# Patient Record
Sex: FEMALE | URBAN - METROPOLITAN AREA
[De-identification: names, ages, dates, MRNs, and addresses within clinical notes are randomized per-mention and may not be internally consistent; named-entity substitution may affect disease eponyms.]

---

## 2017-01-10 ENCOUNTER — IMPORTED ENCOUNTER (OUTPATIENT)
Dept: URBAN - METROPOLITAN AREA CLINIC 59 | Facility: CLINIC | Age: 47
End: 2017-01-10

## 2017-01-10 PROBLEM — H20.011 PRIMARY IRIDOCYCLITIS, RIGHT EYE: Noted: 2017-01-10

## 2017-01-10 PROCEDURE — 99213 OFFICE O/P EST LOW 20 MIN: CPT

## 2017-01-24 ENCOUNTER — IMPORTED ENCOUNTER (OUTPATIENT)
Dept: URBAN - METROPOLITAN AREA CLINIC 59 | Facility: CLINIC | Age: 47
End: 2017-01-24

## 2017-01-24 PROBLEM — H20.011 PRIMARY IRIDOCYCLITIS, RIGHT EYE: Noted: 2017-01-24

## 2017-01-24 PROCEDURE — 92012 INTRM OPH EXAM EST PATIENT: CPT

## 2017-02-20 ENCOUNTER — IMPORTED ENCOUNTER (OUTPATIENT)
Dept: URBAN - METROPOLITAN AREA CLINIC 59 | Facility: CLINIC | Age: 47
End: 2017-02-20

## 2017-02-20 PROBLEM — H20.011 PRIMARY IRIDOCYCLITIS, RIGHT EYE: Noted: 2017-02-20

## 2017-02-20 PROBLEM — H57.02 ANISOCORIA: Noted: 2017-02-20

## 2017-02-20 PROCEDURE — 99213 OFFICE O/P EST LOW 20 MIN: CPT

## 2017-03-15 ENCOUNTER — IMPORTED ENCOUNTER (OUTPATIENT)
Dept: URBAN - METROPOLITAN AREA CLINIC 59 | Facility: CLINIC | Age: 47
End: 2017-03-15

## 2017-03-15 PROBLEM — H57.02 ANISOCORIA: Noted: 2017-03-15

## 2017-03-15 PROBLEM — H40.051 OCULAR HYPERTENSION, RIGHT EYE: Noted: 2017-03-15

## 2017-03-15 PROBLEM — H20.011 PRIMARY IRIDOCYCLITIS, RIGHT EYE: Noted: 2017-03-15

## 2017-03-15 PROCEDURE — 99213 OFFICE O/P EST LOW 20 MIN: CPT

## 2017-03-29 ENCOUNTER — IMPORTED ENCOUNTER (OUTPATIENT)
Dept: URBAN - METROPOLITAN AREA CLINIC 59 | Facility: CLINIC | Age: 47
End: 2017-03-29

## 2017-03-29 PROBLEM — H40.051 OCULAR HYPERTENSION, RIGHT EYE: Noted: 2017-03-29

## 2017-03-29 PROBLEM — H57.02 ANISOCORIA: Noted: 2017-03-29

## 2017-03-29 PROBLEM — H20.011 PRIMARY IRIDOCYCLITIS, RIGHT EYE: Noted: 2017-03-29

## 2017-03-29 PROCEDURE — 99213 OFFICE O/P EST LOW 20 MIN: CPT

## 2017-04-18 ENCOUNTER — IMPORTED ENCOUNTER (OUTPATIENT)
Dept: URBAN - METROPOLITAN AREA CLINIC 59 | Facility: CLINIC | Age: 47
End: 2017-04-18

## 2017-04-18 PROBLEM — H40.051 OCULAR HYPERTENSION, RIGHT EYE: Noted: 2017-04-18

## 2017-04-18 PROBLEM — H20.011 PRIMARY IRIDOCYCLITIS, RIGHT EYE: Noted: 2017-04-18

## 2017-04-18 PROBLEM — H57.02 ANISOCORIA: Noted: 2017-04-18

## 2017-04-18 PROCEDURE — 99214 OFFICE O/P EST MOD 30 MIN: CPT

## 2017-05-02 ENCOUNTER — IMPORTED ENCOUNTER (OUTPATIENT)
Dept: URBAN - METROPOLITAN AREA CLINIC 59 | Facility: CLINIC | Age: 47
End: 2017-05-02

## 2017-05-02 PROBLEM — H57.02 ANISOCORIA: Noted: 2017-05-02

## 2017-05-02 PROBLEM — H20.011 PRIMARY IRIDOCYCLITIS, RIGHT EYE: Noted: 2017-05-02

## 2017-05-02 PROBLEM — H40.051 OCULAR HYPERTENSION, RIGHT EYE: Noted: 2017-05-02

## 2017-05-02 PROCEDURE — 99213 OFFICE O/P EST LOW 20 MIN: CPT

## 2017-05-02 PROCEDURE — 92134 CPTRZ OPH DX IMG PST SGM RTA: CPT

## 2017-05-22 ENCOUNTER — IMPORTED ENCOUNTER (OUTPATIENT)
Dept: URBAN - METROPOLITAN AREA CLINIC 59 | Facility: CLINIC | Age: 47
End: 2017-05-22

## 2017-05-22 PROBLEM — H20.011 PRIMARY IRIDOCYCLITIS, RIGHT EYE: Noted: 2017-05-22

## 2017-05-22 PROBLEM — H57.02 ANISOCORIA: Noted: 2017-05-22

## 2017-05-22 PROBLEM — H40.051 OCULAR HYPERTENSION, RIGHT EYE: Noted: 2017-05-22

## 2017-05-22 PROCEDURE — 99213 OFFICE O/P EST LOW 20 MIN: CPT

## 2017-06-14 ENCOUNTER — IMPORTED ENCOUNTER (OUTPATIENT)
Dept: URBAN - METROPOLITAN AREA CLINIC 59 | Facility: CLINIC | Age: 47
End: 2017-06-14

## 2017-06-14 PROBLEM — H20.011 PRIMARY IRIDOCYCLITIS, RIGHT EYE: Noted: 2017-06-14

## 2017-06-14 PROBLEM — H20.811 FUCHS' HETEROCHROMIC CYCLITIS, RIGHT EYE: Noted: 2017-06-14

## 2017-06-14 PROCEDURE — 99213 OFFICE O/P EST LOW 20 MIN: CPT

## 2017-06-19 ENCOUNTER — IMPORTED ENCOUNTER (OUTPATIENT)
Dept: URBAN - METROPOLITAN AREA CLINIC 59 | Facility: CLINIC | Age: 47
End: 2017-06-19

## 2017-06-19 PROBLEM — H57.02 ANISOCORIA: Noted: 2017-06-19

## 2017-06-19 PROBLEM — H40.051 OCULAR HYPERTENSION, RIGHT EYE: Noted: 2017-06-19

## 2017-06-19 PROBLEM — H20.011 PRIMARY IRIDOCYCLITIS, RIGHT EYE: Noted: 2017-06-19

## 2017-06-19 PROCEDURE — 99213 OFFICE O/P EST LOW 20 MIN: CPT

## 2017-07-24 ENCOUNTER — IMPORTED ENCOUNTER (OUTPATIENT)
Dept: URBAN - METROPOLITAN AREA CLINIC 59 | Facility: CLINIC | Age: 47
End: 2017-07-24

## 2017-07-24 PROBLEM — H57.02 ANISOCORIA: Noted: 2017-07-24

## 2017-07-24 PROBLEM — H20.011 PRIMARY IRIDOCYCLITIS, RIGHT EYE: Noted: 2017-07-24

## 2017-07-24 PROBLEM — H40.051 OCULAR HYPERTENSION, RIGHT EYE: Noted: 2017-07-24

## 2017-07-24 PROCEDURE — 92134 CPTRZ OPH DX IMG PST SGM RTA: CPT

## 2017-07-24 PROCEDURE — 99204 OFFICE O/P NEW MOD 45 MIN: CPT

## 2017-08-08 ENCOUNTER — IMPORTED ENCOUNTER (OUTPATIENT)
Dept: URBAN - METROPOLITAN AREA CLINIC 59 | Facility: CLINIC | Age: 47
End: 2017-08-08

## 2017-08-08 PROBLEM — H20.011 PRIMARY IRIDOCYCLITIS, RIGHT EYE: Noted: 2017-08-08

## 2017-08-08 PROBLEM — Z01.01 ENCOUNTER FOR EXAMINATION OF VISION WITH ABNORMAL FINDINGS: Noted: 2017-08-08

## 2017-08-23 ENCOUNTER — IMPORTED ENCOUNTER (OUTPATIENT)
Dept: URBAN - METROPOLITAN AREA CLINIC 59 | Facility: CLINIC | Age: 47
End: 2017-08-23

## 2017-08-23 PROBLEM — H40.051 OCULAR HYPERTENSION, RIGHT EYE: Noted: 2017-08-23

## 2017-08-23 PROBLEM — H20.011 PRIMARY IRIDOCYCLITIS, RIGHT EYE: Noted: 2017-08-23

## 2017-08-23 PROCEDURE — 92134 CPTRZ OPH DX IMG PST SGM RTA: CPT

## 2017-08-23 PROCEDURE — 99214 OFFICE O/P EST MOD 30 MIN: CPT

## 2017-08-28 ENCOUNTER — IMPORTED ENCOUNTER (OUTPATIENT)
Dept: URBAN - METROPOLITAN AREA CLINIC 59 | Facility: CLINIC | Age: 47
End: 2017-08-28

## 2017-08-28 PROBLEM — H20.011 PRIMARY IRIDOCYCLITIS, RIGHT EYE: Noted: 2017-08-28

## 2017-08-28 PROCEDURE — 92015 DETERMINE REFRACTIVE STATE: CPT

## 2017-09-06 ENCOUNTER — IMPORTED ENCOUNTER (OUTPATIENT)
Dept: URBAN - METROPOLITAN AREA CLINIC 59 | Facility: CLINIC | Age: 47
End: 2017-09-06

## 2017-09-06 PROBLEM — H20.011 PRIMARY IRIDOCYCLITIS, RIGHT EYE: Noted: 2017-09-06

## 2017-09-06 PROBLEM — H25.11 NUCLEAR SCLEROSIS CATARACT OF RT EYE: Noted: 2017-09-06

## 2017-09-06 PROBLEM — H40.051 OCULAR HYPERTENSION, RIGHT EYE: Noted: 2017-09-06

## 2017-09-06 PROCEDURE — 92014 COMPRE OPH EXAM EST PT 1/>: CPT

## 2017-09-06 PROCEDURE — 92134 CPTRZ OPH DX IMG PST SGM RTA: CPT

## 2017-11-06 ENCOUNTER — IMPORTED ENCOUNTER (OUTPATIENT)
Dept: URBAN - METROPOLITAN AREA CLINIC 59 | Facility: CLINIC | Age: 47
End: 2017-11-06

## 2017-11-06 PROBLEM — H20.011 PRIMARY IRIDOCYCLITIS, RIGHT EYE: Noted: 2017-11-06

## 2017-11-06 PROBLEM — H25.11 NUCLEAR SCLEROSIS CATARACT OF RT EYE: Noted: 2017-11-06

## 2017-11-06 PROBLEM — H40.051 OCULAR HYPERTENSION, RIGHT EYE: Noted: 2017-11-06

## 2017-11-06 PROCEDURE — 92134 CPTRZ OPH DX IMG PST SGM RTA: CPT

## 2017-11-06 PROCEDURE — 99214 OFFICE O/P EST MOD 30 MIN: CPT

## 2017-11-16 ENCOUNTER — IMPORTED ENCOUNTER (OUTPATIENT)
Dept: URBAN - METROPOLITAN AREA CLINIC 59 | Facility: CLINIC | Age: 47
End: 2017-11-16

## 2017-11-16 PROBLEM — H40.051 OCULAR HYPERTENSION, RIGHT EYE: Noted: 2017-11-16

## 2017-11-16 PROBLEM — H20.011 PRIMARY IRIDOCYCLITIS, RIGHT EYE: Noted: 2017-11-16

## 2017-11-16 PROBLEM — H25.811 COMBINED FORMS OF AGE-RELATED CATARACT, RIGHT EYE: Noted: 2017-11-16

## 2017-11-16 PROCEDURE — 99214 OFFICE O/P EST MOD 30 MIN: CPT

## 2017-11-16 PROCEDURE — 92136 OPHTHALMIC BIOMETRY: CPT

## 2017-11-16 PROCEDURE — KIT COMPUTER ORTHOPTICS KIT

## 2017-12-06 ENCOUNTER — IMPORTED ENCOUNTER (OUTPATIENT)
Dept: URBAN - METROPOLITAN AREA CLINIC 59 | Facility: CLINIC | Age: 47
End: 2017-12-06

## 2017-12-06 PROCEDURE — 66984 XCAPSL CTRC RMVL W/O ECP: CPT

## 2017-12-07 ENCOUNTER — IMPORTED ENCOUNTER (OUTPATIENT)
Dept: URBAN - METROPOLITAN AREA CLINIC 59 | Facility: CLINIC | Age: 47
End: 2017-12-07

## 2017-12-07 PROBLEM — Z96.1 PRESENCE OF PSEUDOPHAKIA: Noted: 2017-12-07

## 2017-12-07 PROCEDURE — 99024 POSTOP FOLLOW-UP VISIT: CPT

## 2017-12-14 ENCOUNTER — IMPORTED ENCOUNTER (OUTPATIENT)
Dept: URBAN - METROPOLITAN AREA CLINIC 59 | Facility: CLINIC | Age: 47
End: 2017-12-14

## 2017-12-14 PROBLEM — Z96.1 PRESENCE OF PSEUDOPHAKIA: Noted: 2017-12-14

## 2017-12-14 PROCEDURE — 99024 POSTOP FOLLOW-UP VISIT: CPT

## 2018-01-04 ENCOUNTER — IMPORTED ENCOUNTER (OUTPATIENT)
Dept: URBAN - METROPOLITAN AREA CLINIC 59 | Facility: CLINIC | Age: 48
End: 2018-01-04

## 2018-01-04 PROBLEM — Z96.1 PRESENCE OF PSEUDOPHAKIA: Noted: 2018-01-04

## 2018-01-04 PROCEDURE — 99024 POSTOP FOLLOW-UP VISIT: CPT

## 2018-01-15 ENCOUNTER — IMPORTED ENCOUNTER (OUTPATIENT)
Dept: URBAN - METROPOLITAN AREA CLINIC 59 | Facility: CLINIC | Age: 48
End: 2018-01-15

## 2018-01-15 PROBLEM — B00.51 HERPESVIRAL UVEITIS, ANTERIOR: Noted: 2018-01-15

## 2018-01-15 PROBLEM — H40.051 OCULAR HYPERTENSION, RIGHT EYE: Noted: 2018-01-15

## 2018-01-15 PROBLEM — Z96.1 PRESENCE OF PSEUDOPHAKIA: Noted: 2018-01-15

## 2018-01-15 PROCEDURE — 92134 CPTRZ OPH DX IMG PST SGM RTA: CPT

## 2018-01-15 PROCEDURE — 99214 OFFICE O/P EST MOD 30 MIN: CPT

## 2018-01-29 ENCOUNTER — IMPORTED ENCOUNTER (OUTPATIENT)
Dept: URBAN - METROPOLITAN AREA CLINIC 59 | Facility: CLINIC | Age: 48
End: 2018-01-29

## 2018-01-29 PROBLEM — Z96.1 PRESENCE OF PSEUDOPHAKIA: Noted: 2018-01-29

## 2018-01-29 PROBLEM — B00.51 HERPESVIRAL UVEITIS, ANTERIOR: Noted: 2018-01-29

## 2018-01-29 PROCEDURE — 99214 OFFICE O/P EST MOD 30 MIN: CPT

## 2018-01-29 PROCEDURE — 99024 POSTOP FOLLOW-UP VISIT: CPT

## 2018-02-26 ENCOUNTER — IMPORTED ENCOUNTER (OUTPATIENT)
Dept: URBAN - METROPOLITAN AREA CLINIC 59 | Facility: CLINIC | Age: 48
End: 2018-02-26

## 2018-02-26 PROBLEM — Z96.1 PRESENCE OF PSEUDOPHAKIA: Noted: 2018-02-26

## 2018-02-26 PROBLEM — B00.51 HERPESVIRAL UVEITIS, ANTERIOR: Noted: 2018-02-26

## 2018-02-26 PROCEDURE — 99214 OFFICE O/P EST MOD 30 MIN: CPT

## 2018-03-19 ENCOUNTER — IMPORTED ENCOUNTER (OUTPATIENT)
Dept: URBAN - METROPOLITAN AREA CLINIC 59 | Facility: CLINIC | Age: 48
End: 2018-03-19

## 2018-03-19 PROBLEM — H52.4 PRESBYOPIA: Noted: 2018-03-19

## 2018-03-19 PROBLEM — B00.51 HERPESVIRAL UVEITIS, ANTERIOR: Noted: 2018-03-19

## 2018-03-19 PROBLEM — Z01.01 ENCOUNTER FOR EXAMINATION OF VISION WITH ABNORMAL FINDINGS: Noted: 2018-03-19

## 2018-03-19 PROBLEM — Z96.1 PRESENCE OF PSEUDOPHAKIA: Noted: 2018-03-19

## 2018-03-19 PROCEDURE — 92312 CONTACT LENS FITG APHAKIA OU: CPT

## 2018-03-19 PROCEDURE — 92015 DETERMINE REFRACTIVE STATE: CPT

## 2018-03-28 ENCOUNTER — IMPORTED ENCOUNTER (OUTPATIENT)
Dept: URBAN - METROPOLITAN AREA CLINIC 59 | Facility: CLINIC | Age: 48
End: 2018-03-28

## 2018-03-28 PROBLEM — Z96.1 PRESENCE OF PSEUDOPHAKIA: Noted: 2018-03-28

## 2018-03-28 PROBLEM — B00.51 HERPESVIRAL UVEITIS, ANTERIOR: Noted: 2018-03-28

## 2018-03-28 PROCEDURE — 92014 COMPRE OPH EXAM EST PT 1/>: CPT

## 2018-04-05 ENCOUNTER — IMPORTED ENCOUNTER (OUTPATIENT)
Dept: URBAN - METROPOLITAN AREA CLINIC 59 | Facility: CLINIC | Age: 48
End: 2018-04-05

## 2018-04-05 PROBLEM — Z96.1 PRESENCE OF PSEUDOPHAKIA: Noted: 2018-04-05

## 2018-04-20 ENCOUNTER — IMPORTED ENCOUNTER (OUTPATIENT)
Dept: URBAN - METROPOLITAN AREA CLINIC 59 | Facility: CLINIC | Age: 48
End: 2018-04-20

## 2018-04-20 PROBLEM — Z96.1 PRESENCE OF PSEUDOPHAKIA: Noted: 2018-04-20

## 2018-04-25 ENCOUNTER — IMPORTED ENCOUNTER (OUTPATIENT)
Dept: URBAN - METROPOLITAN AREA CLINIC 59 | Facility: CLINIC | Age: 48
End: 2018-04-25

## 2018-04-25 PROBLEM — Z96.1 PRESENCE OF PSEUDOPHAKIA: Noted: 2018-04-25

## 2018-04-25 PROBLEM — H25.11 NUCLEAR SCLEROSIS CATARACT OF RT EYE: Noted: 2018-04-25

## 2018-04-25 PROBLEM — B00.51 HERPESVIRAL UVEITIS, ANTERIOR: Noted: 2018-04-25

## 2018-04-25 PROCEDURE — 99214 OFFICE O/P EST MOD 30 MIN: CPT

## 2018-05-30 ENCOUNTER — IMPORTED ENCOUNTER (OUTPATIENT)
Dept: URBAN - METROPOLITAN AREA CLINIC 59 | Facility: CLINIC | Age: 48
End: 2018-05-30

## 2018-05-30 PROBLEM — Z96.1 PRESENCE OF PSEUDOPHAKIA: Noted: 2018-05-30

## 2018-05-30 PROBLEM — H04.123 TEAR FILM INSUFFICIENCY OF BILATERAL LACRIMAL GLANDS: Noted: 2018-05-30

## 2018-05-30 PROBLEM — B00.51 HERPESVIRAL UVEITIS, ANTERIOR: Noted: 2018-05-30

## 2018-05-30 PROCEDURE — 99214 OFFICE O/P EST MOD 30 MIN: CPT

## 2018-06-27 ENCOUNTER — IMPORTED ENCOUNTER (OUTPATIENT)
Dept: URBAN - METROPOLITAN AREA CLINIC 59 | Facility: CLINIC | Age: 48
End: 2018-06-27

## 2018-06-27 PROBLEM — Z96.1 PRESENCE OF PSEUDOPHAKIA: Noted: 2018-06-27

## 2018-06-27 PROBLEM — B00.51 HERPESVIRAL UVEITIS, ANTERIOR: Noted: 2018-06-27

## 2018-06-27 PROBLEM — H04.123 TEAR FILM INSUFFICIENCY OF BILATERAL LACRIMAL GLANDS: Noted: 2018-06-27

## 2018-06-27 PROCEDURE — 99214 OFFICE O/P EST MOD 30 MIN: CPT

## 2018-07-25 ENCOUNTER — IMPORTED ENCOUNTER (OUTPATIENT)
Dept: URBAN - METROPOLITAN AREA CLINIC 59 | Facility: CLINIC | Age: 48
End: 2018-07-25

## 2018-07-25 PROBLEM — Z96.1 PRESENCE OF PSEUDOPHAKIA: Noted: 2018-07-25

## 2018-07-25 PROBLEM — H04.123 TEAR FILM INSUFFICIENCY OF BILATERAL LACRIMAL GLANDS: Noted: 2018-07-25

## 2018-07-25 PROBLEM — B00.51 HERPESVIRAL UVEITIS, ANTERIOR: Noted: 2018-07-25

## 2018-07-25 PROCEDURE — 99214 OFFICE O/P EST MOD 30 MIN: CPT

## 2018-08-22 ENCOUNTER — IMPORTED ENCOUNTER (OUTPATIENT)
Dept: URBAN - METROPOLITAN AREA CLINIC 59 | Facility: CLINIC | Age: 48
End: 2018-08-22

## 2018-08-22 PROBLEM — H57.02 ANISOCORIA: Noted: 2018-08-22

## 2018-08-22 PROBLEM — H04.123 TEAR FILM INSUFFICIENCY OF BILATERAL LACRIMAL GLANDS: Noted: 2018-08-22

## 2018-08-22 PROBLEM — B00.51 HERPESVIRAL UVEITIS, ANTERIOR: Noted: 2018-08-22

## 2018-08-22 PROBLEM — H20.011 PRIMARY IRIDOCYCLITIS, RIGHT EYE: Noted: 2018-08-22

## 2018-08-22 PROBLEM — H20.811 FUCHS' HETEROCHROMIC CYCLITIS, RIGHT EYE: Noted: 2018-08-22

## 2018-08-22 PROBLEM — Z96.1 PRESENCE OF PSEUDOPHAKIA: Noted: 2018-08-22

## 2018-08-22 PROCEDURE — 99214 OFFICE O/P EST MOD 30 MIN: CPT

## 2023-10-16 ASSESSMENT — TONOMETRY
OS_IOP_MMHG: 7
OD_IOP_MMHG: 12
OS_IOP_MMHG: 8
OS_IOP_MMHG: 10
OD_IOP_MMHG: 13
OS_IOP_MMHG: 13
OD_IOP_MMHG: 8
OS_IOP_MMHG: 8
OD_IOP_MMHG: 10
OS_IOP_MMHG: 7
OD_IOP_MMHG: 22
OS_IOP_MMHG: 11
OD_IOP_MMHG: 10
OS_IOP_MMHG: 7
OD_IOP_MMHG: 8
OS_IOP_MMHG: 10
OD_IOP_MMHG: 11
OD_IOP_MMHG: 10
OD_IOP_MMHG: 11
OD_IOP_MMHG: 7
OD_IOP_MMHG: 13
OD_IOP_MMHG: 8
OD_IOP_MMHG: 9
OD_IOP_MMHG: 14
OS_IOP_MMHG: 10
OD_IOP_MMHG: 18
OD_IOP_MMHG: 8
OD_IOP_MMHG: 32
OD_IOP_MMHG: 7
OD_IOP_MMHG: 7
OD_IOP_MMHG: 9
OS_IOP_MMHG: 8
OS_IOP_MMHG: 10
OS_IOP_MMHG: 11
OS_IOP_MMHG: 9
OS_IOP_MMHG: 10
OD_IOP_MMHG: 10
OD_IOP_MMHG: 8
OS_IOP_MMHG: 10
OS_IOP_MMHG: 14
OD_IOP_MMHG: 15
OS_IOP_MMHG: 11
OD_IOP_MMHG: 14
OD_IOP_MMHG: 12
OS_IOP_MMHG: 8
OD_IOP_MMHG: 10
OS_IOP_MMHG: 10
OS_IOP_MMHG: 12
OD_IOP_MMHG: 10
OS_IOP_MMHG: 10
OS_IOP_MMHG: 12

## 2023-10-16 ASSESSMENT — VISUAL ACUITY
OD_SC: 20/25+2
OD_CC: 20/20
OS_CC: 20/30
OS_CC: 20/20
OD_CC: 20/20-2
OS_CC: 20/15
OS_CC: 20/20
OS_CC: 20/20
OD_CC: 20/30-2
OS_CC: 20/15
OS_CC: 20/20
OS_CC: 20/20
OD_CC: 20/25-2
OD_CC: 20/20-1
OS_CC: 20/20
OD_CC: 20/25-2
OS_BAT: 20/20
OD_CC: 20/60
OS_CC: 20/20
OD_CC: 20/20-2
OD_CC: 20/25-2
OS_CC: 20/20
OD_BAT: 20/70
OS_CC: 20/20
OD_CC: 20/20-1
OS_CC: 20/20-1
OS_CC: 20/20
OD_CC: 20/25
OD_CC: 20/20
OS_CC: 20/20
OD_SC: 20/20
OD_CC: 20/20
OD_CC: 20/20-1
OS_CC: 20/15-1
OS_CC: 20/20
OD_CC: 20/30-1
OD_CC: 20/25-2
OS_CC: 20/20
OD_SC: 20/20-2
OS_CC: 20/20
OD_CC: 20/20-2
OD_SC: 20/25
OD_SC: 20/20
OD_SC: 20/30+2
OD_CC: 20/20-1
OS_CC: 20/20
OD_CC: 20/50+2
OS_CC: 20/20
OS_CC: 20/20
OD_CC: 20/20-2
OD_CC: 20/20-1
OS_CC: 20/20
OS_CC: 20/20
OD_SC: 20/25-1
OS_CC: 20/20
OD_CC: 20/30

## 2023-10-16 ASSESSMENT — PACHYMETRY
OD_CT_UM: C:  FINAL: 0.000; P:
OD_CT_UM: C:  FINAL: 0.000; P:
OS_CT_UM: C:  FINAL: 0.000; P:
OS_CT_UM: C:  FINAL: 0.000; P:
OD_CT_UM: C:  FINAL: 0.000; P:
OS_CT_UM: C:  FINAL: 0.000; P:
OD_CT_UM: C:  FINAL: 0.000; P:
OS_CT_UM: C:  FINAL: 0.000; P:

## 2023-10-16 ASSESSMENT — KERATOMETRY
OD_AXISANGLE2_DEGREES: 97
OD_AXISANGLE_DEGREES: 7
OD_K2POWER_DIOPTERS: 43.12
OS_K1POWER_DIOPTERS: 42.19
OS_AXISANGLE2_DEGREES: 67
OS_K2POWER_DIOPTERS: 43.00
OD_K1POWER_DIOPTERS: 42.31
OS_AXISANGLE_DEGREES: 157

## 2024-06-24 ENCOUNTER — APPOINTMENT (RX ONLY)
Dept: URBAN - METROPOLITAN AREA CLINIC 341 | Facility: CLINIC | Age: 54
Setting detail: DERMATOLOGY
End: 2024-06-24

## 2024-06-24 DIAGNOSIS — L81.4 OTHER MELANIN HYPERPIGMENTATION: ICD-10-CM

## 2024-06-24 DIAGNOSIS — L82.0 INFLAMED SEBORRHEIC KERATOSIS: ICD-10-CM | Status: INADEQUATELY CONTROLLED

## 2024-06-24 DIAGNOSIS — D22 MELANOCYTIC NEVI: ICD-10-CM

## 2024-06-24 DIAGNOSIS — T300 ERYTHEMA [FIRST DEGREE], UNSPECIFIED SITE: ICD-10-CM | Status: INADEQUATELY CONTROLLED

## 2024-06-24 DIAGNOSIS — D485 NEOPLASM OF UNCERTAIN BEHAVIOR OF SKIN: ICD-10-CM | Status: INADEQUATELY CONTROLLED

## 2024-06-24 DIAGNOSIS — D18.0 HEMANGIOMA: ICD-10-CM

## 2024-06-24 DIAGNOSIS — L82.1 OTHER SEBORRHEIC KERATOSIS: ICD-10-CM

## 2024-06-24 DIAGNOSIS — Z85.828 PERSONAL HISTORY OF OTHER MALIGNANT NEOPLASM OF SKIN: ICD-10-CM

## 2024-06-24 PROBLEM — D18.01 HEMANGIOMA OF SKIN AND SUBCUTANEOUS TISSUE: Status: ACTIVE | Noted: 2024-06-24

## 2024-06-24 PROBLEM — D22.62 MELANOCYTIC NEVI OF LEFT UPPER LIMB, INCLUDING SHOULDER: Status: ACTIVE | Noted: 2024-06-24

## 2024-06-24 PROBLEM — D48.5 NEOPLASM OF UNCERTAIN BEHAVIOR OF SKIN: Status: ACTIVE | Noted: 2024-06-24

## 2024-06-24 PROBLEM — T22.112A BURN OF FIRST DEGREE OF LEFT FOREARM, INITIAL ENCOUNTER: Status: ACTIVE | Noted: 2024-06-24

## 2024-06-24 PROCEDURE — ? BIOPSY BY SHAVE METHOD

## 2024-06-24 PROCEDURE — ? COUNSELING

## 2024-06-24 PROCEDURE — ? LIQUID NITROGEN

## 2024-06-24 PROCEDURE — ? TREATMENT REGIMEN

## 2024-06-24 PROCEDURE — ? FULL BODY SKIN EXAM

## 2024-06-24 PROCEDURE — 11102 TANGNTL BX SKIN SINGLE LES: CPT | Mod: 59

## 2024-06-24 PROCEDURE — 99203 OFFICE O/P NEW LOW 30 MIN: CPT | Mod: 25

## 2024-06-24 PROCEDURE — ? OTHER

## 2024-06-24 PROCEDURE — 17110 DESTRUCTION B9 LES UP TO 14: CPT

## 2024-06-24 ASSESSMENT — LOCATION ZONE DERM
LOCATION ZONE: FACE
LOCATION ZONE: HAND
LOCATION ZONE: TRUNK
LOCATION ZONE: ARM

## 2024-06-24 ASSESSMENT — LOCATION DETAILED DESCRIPTION DERM
LOCATION DETAILED: LEFT PROXIMAL DORSAL FOREARM
LOCATION DETAILED: 3RD WEB SPACE LEFT HAND
LOCATION DETAILED: RIGHT LATERAL TEMPLE
LOCATION DETAILED: SUPERIOR THORACIC SPINE
LOCATION DETAILED: LEFT DORSAL WRIST
LOCATION DETAILED: LEFT ULNAR DORSAL HAND
LOCATION DETAILED: LEFT CENTRAL MALAR CHEEK

## 2024-06-24 ASSESSMENT — LOCATION SIMPLE DESCRIPTION DERM
LOCATION SIMPLE: LEFT HAND
LOCATION SIMPLE: UPPER BACK
LOCATION SIMPLE: LEFT CHEEK
LOCATION SIMPLE: RIGHT TEMPLE
LOCATION SIMPLE: LEFT WRIST
LOCATION SIMPLE: LEFT FOREARM

## 2024-06-24 NOTE — PROCEDURE: LIQUID NITROGEN
Show Aperture Variable?: Yes
Medical Necessity Clause: This procedure was medically necessary because the lesions that were treated were:
Render Post-Care Instructions In Note?: no
Post-Care Instructions: I reviewed with the patient in detail post-care instructions. Patient is to wear sunprotection, and avoid picking at any of the treated lesions. Pt may apply Vaseline to crusted or scabbing areas.
Spray Paint Text: The liquid nitrogen was applied to the skin utilizing a spray paint frosting technique.
Medical Necessity Information: It is in your best interest to select a reason for this procedure from the list below. All of these items fulfill various CMS LCD requirements except the new and changing color options.
Consent: The patient's consent was obtained including but not limited to risks of crusting, scabbing, blistering, scarring, darker or lighter pigmentary change, recurrence, incomplete removal and infection.
Detail Level: Detailed

## 2024-06-24 NOTE — PROCEDURE: OTHER
Other (Free Text): Back (before 2021) : pt uncertain if SCC or BCC, treated at a different facility
Detail Level: Simple
Note Text (......Xxx Chief Complaint.): This diagnosis correlates with the
Render Risk Assessment In Note?: no

## 2024-07-19 ENCOUNTER — APPOINTMENT (RX ONLY)
Dept: URBAN - METROPOLITAN AREA CLINIC 341 | Facility: CLINIC | Age: 54
Setting detail: DERMATOLOGY
End: 2024-07-19

## 2024-07-19 DIAGNOSIS — D22 MELANOCYTIC NEVI: ICD-10-CM | Status: RESOLVED

## 2024-07-19 PROBLEM — D22.39 MELANOCYTIC NEVI OF OTHER PARTS OF FACE: Status: ACTIVE | Noted: 2024-07-19

## 2024-07-19 PROCEDURE — 99212 OFFICE O/P EST SF 10 MIN: CPT

## 2024-07-19 PROCEDURE — ? PATHOLOGY DISCUSSION

## 2024-07-19 PROCEDURE — ? COUNSELING

## 2024-07-19 ASSESSMENT — LOCATION ZONE DERM: LOCATION ZONE: FACE

## 2024-07-19 ASSESSMENT — LOCATION SIMPLE DESCRIPTION DERM: LOCATION SIMPLE: RIGHT TEMPLE

## 2024-07-19 ASSESSMENT — LOCATION DETAILED DESCRIPTION DERM: LOCATION DETAILED: RIGHT LATERAL TEMPLE

## 2025-08-21 ENCOUNTER — APPOINTMENT (OUTPATIENT)
Dept: URBAN - METROPOLITAN AREA CLINIC 341 | Facility: CLINIC | Age: 55
Setting detail: DERMATOLOGY
End: 2025-08-21

## 2025-08-21 DIAGNOSIS — L81.1 CHLOASMA: ICD-10-CM | Status: INADEQUATELY CONTROLLED

## 2025-08-21 DIAGNOSIS — L82.1 OTHER SEBORRHEIC KERATOSIS: ICD-10-CM | Status: STABLE

## 2025-08-21 DIAGNOSIS — D18.0 HEMANGIOMA: ICD-10-CM | Status: STABLE

## 2025-08-21 DIAGNOSIS — L81.4 OTHER MELANIN HYPERPIGMENTATION: ICD-10-CM | Status: STABLE

## 2025-08-21 DIAGNOSIS — D22 MELANOCYTIC NEVI: ICD-10-CM | Status: STABLE

## 2025-08-21 DIAGNOSIS — Z41.9 ENCOUNTER FOR PROCEDURE FOR PURPOSES OTHER THAN REMEDYING HEALTH STATE, UNSPECIFIED: ICD-10-CM

## 2025-08-21 PROBLEM — D23.61 OTHER BENIGN NEOPLASM OF SKIN OF RIGHT UPPER LIMB, INCLUDING SHOULDER: Status: ACTIVE | Noted: 2025-08-21

## 2025-08-21 PROBLEM — D22.5 MELANOCYTIC NEVI OF TRUNK: Status: ACTIVE | Noted: 2025-08-21

## 2025-08-21 PROBLEM — D18.01 HEMANGIOMA OF SKIN AND SUBCUTANEOUS TISSUE: Status: ACTIVE | Noted: 2025-08-21

## 2025-08-21 PROCEDURE — ? OTHER

## 2025-08-21 PROCEDURE — ? PRESCRIPTION MEDICATION MANAGEMENT

## 2025-08-21 PROCEDURE — ? FULL BODY SKIN EXAM

## 2025-08-21 PROCEDURE — ? TREATMENT REGIMEN

## 2025-08-21 PROCEDURE — ? COUNSELING

## 2025-08-21 PROCEDURE — ? COSMETIC CONSULTATION: CHEMICAL PEELS

## 2025-08-21 PROCEDURE — ? PRESCRIPTION

## 2025-08-21 RX ORDER — FLUOCINOLONE ACETONIDE, HYDROQUINONE, AND TRETINOIN .1; 40; .5 MG/G; MG/G; MG/G
CREAM TOPICAL
Qty: 30 | Refills: 2 | Status: ERX | COMMUNITY
Start: 2025-08-21

## 2025-08-21 RX ADMIN — FLUOCINOLONE ACETONIDE, HYDROQUINONE, AND TRETINOIN: .1; 40; .5 CREAM TOPICAL at 00:00

## 2025-08-21 ASSESSMENT — LOCATION DETAILED DESCRIPTION DERM
LOCATION DETAILED: RIGHT INFERIOR CENTRAL MALAR CHEEK
LOCATION DETAILED: LEFT CENTRAL MALAR CHEEK
LOCATION DETAILED: EPIGASTRIC SKIN
LOCATION DETAILED: PERIUMBILICAL SKIN

## 2025-08-21 ASSESSMENT — LOCATION SIMPLE DESCRIPTION DERM
LOCATION SIMPLE: LEFT CHEEK
LOCATION SIMPLE: RIGHT CHEEK
LOCATION SIMPLE: ABDOMEN

## 2025-08-21 ASSESSMENT — LOCATION ZONE DERM
LOCATION ZONE: TRUNK
LOCATION ZONE: FACE